# Patient Record
Sex: FEMALE | Race: WHITE | NOT HISPANIC OR LATINO | Employment: FULL TIME | URBAN - METROPOLITAN AREA
[De-identification: names, ages, dates, MRNs, and addresses within clinical notes are randomized per-mention and may not be internally consistent; named-entity substitution may affect disease eponyms.]

---

## 2019-03-10 ENCOUNTER — OFFICE VISIT (OUTPATIENT)
Dept: URGENT CARE | Facility: URGENT CARE | Age: 44
End: 2019-03-10
Payer: COMMERCIAL

## 2019-03-10 VITALS
RESPIRATION RATE: 16 BRPM | HEART RATE: 89 BPM | BODY MASS INDEX: 33.59 KG/M2 | SYSTOLIC BLOOD PRESSURE: 110 MMHG | OXYGEN SATURATION: 100 % | TEMPERATURE: 99.6 F | HEIGHT: 67 IN | DIASTOLIC BLOOD PRESSURE: 60 MMHG | WEIGHT: 214 LBS

## 2019-03-10 DIAGNOSIS — R05.9 COUGH: Primary | ICD-10-CM

## 2019-03-10 LAB
FLUAV+FLUBV AG SPEC QL: NEGATIVE
FLUAV+FLUBV AG SPEC QL: NEGATIVE
SPECIMEN SOURCE: NORMAL

## 2019-03-10 PROCEDURE — 87804 INFLUENZA ASSAY W/OPTIC: CPT | Performed by: FAMILY MEDICINE

## 2019-03-10 PROCEDURE — 99202 OFFICE O/P NEW SF 15 MIN: CPT | Performed by: FAMILY MEDICINE

## 2019-03-10 ASSESSMENT — MIFFLIN-ST. JEOR: SCORE: 1663.33

## 2019-03-11 NOTE — PROGRESS NOTES
Subjective: Yesterday afternoon patient started feeling sick, feverish, cough, chest pain.  A little yellow mucus comes up.  Last night at 4 AM her temperature was 101.5.  2 days ago she did have hemorrhoid banding procedure done.  She had a flu shot.  Nobody else at home is sick.  No unusual discharge from the rectum.    Objective: Temperature 99.6, does not appear toxic.  ENT is normal.  Neck is normal.  Lungs are clear.  Heart is regular without murmurs.  Abdomen benign.  Flu test was done, negative.    Assessment and plan: Viral URI, not influenza, expected to run the usual course.

## 2019-10-26 ENCOUNTER — OFFICE VISIT (OUTPATIENT)
Dept: URGENT CARE | Facility: URGENT CARE | Age: 44
End: 2019-10-26
Payer: COMMERCIAL

## 2019-10-26 VITALS
TEMPERATURE: 97.8 F | DIASTOLIC BLOOD PRESSURE: 60 MMHG | SYSTOLIC BLOOD PRESSURE: 98 MMHG | HEIGHT: 67 IN | RESPIRATION RATE: 12 BRPM | BODY MASS INDEX: 34.37 KG/M2 | WEIGHT: 219 LBS | HEART RATE: 80 BPM

## 2019-10-26 DIAGNOSIS — R07.0 THROAT PAIN: ICD-10-CM

## 2019-10-26 DIAGNOSIS — J03.90 TONSILLITIS: Primary | ICD-10-CM

## 2019-10-26 LAB
DEPRECATED S PYO AG THROAT QL EIA: NORMAL
SPECIMEN SOURCE: NORMAL

## 2019-10-26 PROCEDURE — 87880 STREP A ASSAY W/OPTIC: CPT | Performed by: FAMILY MEDICINE

## 2019-10-26 PROCEDURE — 99213 OFFICE O/P EST LOW 20 MIN: CPT | Performed by: FAMILY MEDICINE

## 2019-10-26 PROCEDURE — 87081 CULTURE SCREEN ONLY: CPT | Performed by: FAMILY MEDICINE

## 2019-10-26 ASSESSMENT — MIFFLIN-ST. JEOR: SCORE: 1686.01

## 2019-10-26 NOTE — PATIENT INSTRUCTIONS
Patient Education     Pharyngitis (Sore Throat), Report Pending    Pharyngitis (sore throat) is often due to a virus. It can also be caused by streptococcus (strep), bacteria. This is often called strep throat. Both viral and strep infections can cause throat pain that is worse when swallowing, aching all over, headache, and fever. Both types of infections are contagious. They may be spread by coughing, kissing, or touching others after touching your mouth or nose.  A test has been done to find out if you or your child have strep throat. Call this facility or your healthcare provider if you were not given your test results. If the test is positive for strep infection, you will need to take antibiotic medicines. A prescription can be called into your pharmacy at that time. If the test is negative, you probably have a viral pharyngitis. This does not need to be treated with antibiotics. Until you receive the results of the strep test, you should stay home from work. If your child is being tested, he or she should stay home from school.  Home care    Rest at home. Drink plenty of fluids so you won't get dehydrated.    If the test is positive for strep, you or your child should not go to work or school for the first 2 days of taking the antibiotics. After this time, you or your child will not be contagious. You or your child can then return to work or school when feeling better.     Use the antibiotic medicine for the full 10 days. Do not stop the medicine even if you or your child feel better. This is very important to make sure the infection is fully treated. It is also important to prevent medicine-resistant germs from growing. If you or your child were given an antibiotic shot, no more antibiotics are needed.    Use throat lozenges or numbing throat sprays to help reduce pain. Gargling with warm salt water will also help reduce throat pain. Dissolve 1/2 teaspoon of salt in 1 glass of warm water. Children can sip  on juice or a popsicle. Children 5 years and older can also suck on a lollipop or hard candy.    Don't eat salty or spicy foods or give them to your child. These can irritate the throat.  Other medicine for a child: You can give your child acetaminophen for fever, fussiness, or discomfort. In babies over 6 months of age, you may use ibuprofen instead of acetaminophen. If your child has chronic liver or kidney disease or ever had a stomach ulcer or GI bleeding, talk with your child s healthcare provider before giving these medicines. Aspirin should never be used by any child under 18 years of age who has a fever. It may cause severe liver damage.  Other medicine for an adult: You may use acetaminophen or ibuprofen to control pain or fever, unless another medicine was prescribed for this. If you have chronic liver or kidney disease or ever had a stomach ulcer or GI bleeding, talk with your healthcare provider before using these medicines.  Follow-up care  Follow up with your healthcare provider or our staff if you or your child don't get better over the next week.  When to seek medical advice  Call your healthcare provider right away if any of these occur:    Fever as directed by your healthcare provider. For children, seek care if:  ? Your child is of any age and has repeated fevers above 104 F (40 C).  ? Your child is younger than 2 years of age and has a fever of 100.4 F (38 C) for more than 1 day.  ? Your child is 2 years old or older and has a fever of 100.4 F (38 C) for more than 3 days.    New or worsening ear pain, sinus pain, or headache    Painful lumps in the back of neck    Stiff neck    Lymph nodes are getting larger     Can t swallow liquids, a lot of drooling, or can t open mouth wide due to throat pain    Signs of dehydration, such as very dark urine or no urine, sunken eyes, dizziness    Trouble breathing or noisy breathing    Muffled voice    New rash    Other symptoms getting worse  Prevention  Here  are steps you can take to help prevent an infection:    Keep good hand washing habits.    Don t have close contact with people who have sore throats, colds, or other upper respiratory infections.    Don t smoke, and stay away from secondhand smoke.    Stay up to date with of your vaccines.  Date Last Reviewed: 11/1/2017 2000-2018 [employer]. [address] [place], [place] [id number]. All rights reserved. This information is not intended as a substitute for professional medical care. Always follow your healthcare professional's instructions.

## 2019-10-26 NOTE — PROGRESS NOTES
"SUBJECTIVE:  Genoveva is a 43 year old female who presents to urgent care with sore throat and laryngitis.  She has been sick for 2 weeks. Initially had rhinorrhea, sore throat, body aches, chills, mild cough for almost a week. Cough was getting better when her sore throat suddenly significantly worsened. She now for 2 days can barely swallow due to pain and swelling in her throat. She again has body aches that have re-started. Kids at home, but they are not sick.   No fever.   She is a little nauseated but is also pregnant (6wks).     At baseline she has hypothyroid, asthma, meds updated in her chart. Allergies to sulfa and naproxen  Does not smoke.    OBJECTIVE:  BP 98/60   Pulse 80   Temp 97.8  F (36.6  C) (Oral)   Resp 12   Ht 1.702 m (5' 7\")   Wt 99.3 kg (219 lb)   Breastfeeding? No   BMI 34.30 kg/m     GENERAL APPEARANCE: healthy, alert and no distress  Resp: clear to auscultation bilaterally, no wheeze or crackles.   Card: RRR, no murmur, normal S1/S2, no LE swelling.  HENT: TMs normal bilaterally, posterior oropharynx is significantly erythematous with diffuse exudate on the tonsils as well as posterior pharynx. Symmetric 2+ tonsils. No other oral lesions.   Significant diffuse anterior and posterior cervical lymphadenopathy bilaterally.     Results for orders placed or performed in visit on 10/26/19   Strep, Rapid Screen   Result Value Ref Range    Specimen Description Throat     Rapid Strep A Screen       NEGATIVE: No Group A streptococcal antigen detected by immunoassay, await culture report.        ASSESSMENT/PLAN:  Genoveva was seen today for urgent care and uri.    Diagnoses and all orders for this visit:    Tonsillitis: despite negative strep, she has quite significant pharyngitis which is a second-sickening after a recent virus. This could be a secondary virus, but given the severity of her symptoms, we discussed options and elected to treat with Augmentin today (Category B in early " pregnancy)Discussed concerning symptoms for which to return.   -     amoxicillin-clavulanate (AUGMENTIN) 875-125 MG tablet; Take 1 tablet by mouth 2 times daily    Throat pain  -     Strep, Rapid Screen  -     Beta strep group A culture        The plan of care was discussed with the Patient. They understand and agree with the course of treatment prescribed. A printed summary was given including instructions and medications.      Ioana Whitaker MD  Family Medicine

## 2019-10-27 LAB
BACTERIA SPEC CULT: NORMAL
SPECIMEN SOURCE: NORMAL

## 2019-10-31 ENCOUNTER — HEALTH MAINTENANCE LETTER (OUTPATIENT)
Age: 44
End: 2019-10-31

## 2020-11-07 ENCOUNTER — HEALTH MAINTENANCE LETTER (OUTPATIENT)
Age: 45
End: 2020-11-07

## 2021-06-24 ENCOUNTER — OFFICE VISIT (OUTPATIENT)
Dept: URGENT CARE | Facility: URGENT CARE | Age: 46
End: 2021-06-24
Payer: COMMERCIAL

## 2021-06-24 VITALS
HEART RATE: 74 BPM | BODY MASS INDEX: 34.3 KG/M2 | DIASTOLIC BLOOD PRESSURE: 70 MMHG | WEIGHT: 219 LBS | SYSTOLIC BLOOD PRESSURE: 103 MMHG | OXYGEN SATURATION: 99 % | TEMPERATURE: 98.7 F

## 2021-06-24 DIAGNOSIS — S61.210A LACERATION OF RIGHT INDEX FINGER WITHOUT FOREIGN BODY WITHOUT DAMAGE TO NAIL, INITIAL ENCOUNTER: Primary | ICD-10-CM

## 2021-06-24 PROCEDURE — 99213 OFFICE O/P EST LOW 20 MIN: CPT | Performed by: FAMILY MEDICINE

## 2021-06-24 NOTE — PATIENT INSTRUCTIONS
Change dressing in 2 days using the supplies given to you        If you develop new pain/redness/fever/warmth of finger please return for re-evaluation        New tissue will gradually form over the next few weeks later in the week start using bacitracin or vaseline gel to form a thin layer on the surface and cover the finger with a bandage

## 2021-06-24 NOTE — PROGRESS NOTES
Assessment & Plan     Laceration of left index finger without foreign body without damage to nail, initial encounter       Wound rinsed in chlorhexidine /water    Attempt at pressure and elevation the finger continued to bleed -- opted to use surgifoam and then wrapped the digit with bandage+ coban. Additional supplies given    Change dressing in 2 days -    Observe for signs/symptoms of infection    Nathaniel Dutta MD   [facility]    Yesy Tomas is a 45 year old female who presents to clinic today for the following health issues:  Chief Complaint   Patient presents with     Urgent Care     Laceration     c/o laceration on finger      HPI    Cut finger while slicing brussel sprouts with mandolin this evening  No alcohol consumption    There are no active problems to display for this patient.      Current Outpatient Medications   Medication     levonorgestrel (MIRENA) 20 MCG/24HR IUD     ALLEGRA 60 MG OR TABS     amoxicillin-clavulanate (AUGMENTIN) 875-125 MG tablet     ASMANEX 120 METERED DOSES 220 MCG/INH IN AEPB     ASTELIN 137 MCG/SPRAY NA SOLN     LEVOTHYROXINE SODIUM PO     PATANOL 0.1 % OP SOLN     PROAIR  (90 BASE) MCG/ACT IN AERS     UNKNOWN MED DOSAGE     No current facility-administered medications for this visit.            Objective    /70   Pulse 74   Temp 98.7  F (37.1  C) (Tympanic)   Wt 99.3 kg (219 lb)   SpO2 99%   Breastfeeding Unknown   BMI 34.30 kg/m    Physical Exam     R finger: inner side of thumb a rectangular shaped wound about 1.5 x 0.5 cm missing section of tissue thru the dermal layer -- no damage to fingernail. Intact function in flexion/extension    No results found for any visits on 06/24/21.                  The use of Dragon/[employer] dictation services may have been used to construct the content in this note; any grammatical or spelling errors are non-intentional. Please contact the author of this note directly if you are in need of any  clarification.

## 2021-09-05 ENCOUNTER — HEALTH MAINTENANCE LETTER (OUTPATIENT)
Age: 46
End: 2021-09-05

## 2021-12-26 ENCOUNTER — HEALTH MAINTENANCE LETTER (OUTPATIENT)
Age: 46
End: 2021-12-26

## 2022-02-15 ENCOUNTER — VIRTUAL VISIT (OUTPATIENT)
Dept: CONSULT | Facility: CLINIC | Age: 47
End: 2022-02-15
Attending: GENETIC COUNSELOR, MS
Payer: COMMERCIAL

## 2022-02-15 DIAGNOSIS — Z84.81 FAMILY HISTORY OF CARRIER OF GENETIC DISEASE: Primary | ICD-10-CM

## 2022-02-15 PROCEDURE — 96040 HC GENETIC COUNSELING, EACH 30 MINUTES: CPT | Mod: GT | Performed by: GENETIC COUNSELOR, MS

## 2022-02-15 RX ORDER — MULTIVITAMIN,THER AND MINERALS
1 TABLET ORAL DAILY
COMMUNITY

## 2022-02-15 RX ORDER — SERTRALINE HYDROCHLORIDE 25 MG/1
25 TABLET, FILM COATED ORAL EVERY MORNING
COMMUNITY
Start: 2021-03-10

## 2022-02-15 NOTE — LETTER
2/15/2022      RE: Genoveva Gregory  [address] [place] [address]       Name:  Genoveva Gregory  :   1975  MRN:   [id number]  Date of service: Feb 15, 2022  Primary Provider: No Ref-Primary, Physician  Referring Provider: Referred Self    Presenting Information:  Genoveva, a 46 year old female, was seen via a video visit at the [facility] for discussion of carrier testing for glycogen storage disease V.  I met with Genoveva to discuss the genetics and inheritance of GSD V and to obtain informed consent for genetic testing.       Family History:   A limited family history was obtained today. The following information is notable:    Genoveva has a history of a wheat allergy and subclinical hypothyroidism.     Genoveva has 4 sons (ages 6,3,1, and 1) all of whom are well.    Genoveva's , Levon, was seen at the [facility] for evaluation of multiple episodes of rhabdomyolysis. 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 Follow-up parental testing found Levon's mother to carry the PYGM: c.661-601G>A and Levon's father was found to carry the PYGM: c.1805G>A. This confirms that the variants are on opposite chromosomes and thus were inherited in a way which can cause glycogen storage disease V in Levon. The PYGM: c.1805G>A variant is still considered a variant of uncertain significance by the lab; however, we believe with Levon's clinical presentation and these results that it is diease causing. It is possible that the variant will be officially reclassified as disease-causing in the future. At this point, we consider these results diagnostic for glycogen storage disease V.       The family history is otherwise negative for elevated CK, muscle weakness/pain, exercise intolerance, or known genetic disorders.       Discussion and  Assessment:  Genetics  Genes are long stretches of DNA that are responsible for how our bodies look and how our bodies work. Our genes are inherited on structures called chromosomes of which we have 23 pairs. One copy of each chromosome in a pair is inherited from the mother and one is inherited from the father. Changes in the DNA sequence of a gene, called mutations, as well as changes within the chromosomes can cause the signs and symptoms of a genetic condition.     GSD V  Glycogen storage disease V (GSD V) is characterized by exercise intolerance. Symptoms include exertion-induced premature fatigue, myalgia, muscle cramping, and recurrent myoglobinuria. Symptoms typically begin in a person's teens or twenties, but the can appear anytime from infancy to adulthood.  In most people with GSD V, the muscle weakness worsens over time; however, in about one-third of affected individuals, the muscle weakness is stable. Some people with GSD V experience mild symptoms such as poor stamina; others do not experience any symptoms.     Inheritance of GSD V  GSD V is inherited in an autosomal recessive way. This means that both copies of a person's PYGM gene need to have pathogenic variants in them in order for a person to show symptoms of the condition. Individuals who only have one pathogenic variant are called carriers. While carriers generally do not show signs or symptoms of the condition, this can have implications for future generations so genetic counseling and testing can be important.     Risk to Family Members  Children between Genoveva and Levon are each automatically a carrier for GSD V. They could only be affected if Genoveva is a carrier of GSD V. We reviewed that carrier testing is available to Genoveva to better determine this risk. This will involve analyzing Genoveva's copies of the PYGM gene to determine if she carries any pathogenic changes. If Genoveva is not found to be a carrier, each of their children would be a carrier for  GSD V, but would not be at an increased risk of being affected with GSD V. Their future partners could consider their own carrier testing as needed when they are considering starting their own families. If Genoveva is found to be a carrier, each of their children would have a 50% chance of being affected with GSD V and a 50% chance of being a carrier. Their own metabolic consultation and genetic testing would be recommended at that time to determine their risk.      Resources:     Medline Plus: https://medlineplus.gov/genetics/condition/glycogen-storage-disease-type-v/    Rare Disease: https://rarediseases.org/rare-diseases/glycogen-storage-disease-type-v/    GeneReviews: https://www.ncbi.nlm.nih.gov/books/CMO0388/     Genetic Testing   Genoveva was interested in proceeding with carrier testing for GSD V today. The benefits, limitations, and possible results from the testing were discussed. It is medically necessary to determine if Genoveva's children are at an increased risk of having GSD V so they can be appropriately monitored and managed.     The lab we are using for this test is called [facility]. They will send a buccal kit directly to Genoveva who will then be instructed to collect the specimen and mail it back to the lab. This kit must be completed and appropriately labelled with name and date of birth.    Insurance and billing procedures were covered with Genoveva. Once [facility] receives the sample, they will do a benefits investigation and contact Genoveva with an estimated out of pocket cost if expected to be more than $100. This estimation is not guaranteed. At that time, Genoveva has the right to decline to proceed with testing based on the benefits investigation. If [facility] does not hear back from Genoveva after three attempts to connect, testing will be canceled. If the benefits investigation is too high for Genoveva Thomas offers financial assistance based on house-hold income and household size. she may also switch to the patient-pay  price. If the estimation of benefits is less than $100, Genoveva will not be contacted and testing will be automatically initiated.     Genoveva provided verbal informed consent for the testing due to COVID-19. I will plan to follow-up with Genoveva by phone when results are returned, approximately 3 weeks after the testing is initiated. A follow-up appointment will be scheduled as needed. Additional questions or concerns were denied at this time.       Plan:  1. A buccal swab kit will be sent directly to Genoveva from the laboratory. Once the kit is received back by the laboratory, the benefits investigation will begin and Genoveva will be contacted with the outcome.    2. If they want to proceed at that time, Genoveva's PYGM gene analysis will be initiated. If the estimation is less than $100, she will not be contacted and testing will begin automatically.    3. Results are expected approximately 3 weeks after this and will be returned by phone; a follow-up appointment will be scheduled as needed at that time.    4. Contact information was provided should any questions arise in the future.       Krystal Stoddard MS, [facility]  Genetic Counselor  [facility]   Phone: [phone]        Approximate Time Spent in Consultation: 18 min     CC: no letter      Krystal Stoddard GC

## 2022-02-15 NOTE — NURSING NOTE
How would you like to obtain your AVS? [place]ar[facility]  If the video visit is dropped, the invitation should be resent by: Send to e-mail at: [email or web address]  Will anyone else be joining your video visit? No

## 2022-02-15 NOTE — LETTER
Date:February 16, 2022      Provider requested that no letter be sent. Do not send.       [facility]

## 2022-03-23 ENCOUNTER — TELEPHONE (OUTPATIENT)
Dept: CONSULT | Facility: CLINIC | Age: 47
End: 2022-03-23
Payer: COMMERCIAL

## 2022-03-23 NOTE — TELEPHONE ENCOUNTER
I called Genoveva to discuss the results of her genetic testing.    Genoveva's PYGM gene analysis was completed at [facility], [place]. These results were negative/normal. Genoveva was not found to carry any disease-causing or uncertain changes in the PYGM gene. Based on this result, Genoveva is not a carrier for glycogen storage disease V (GSD V).     Because Genoveva's partner has GSD V, each child between the couple is automatically a carrier for GSD V. However, they are not expected to be at an increased risk of being affected with GSD V. They could have a child with GSD V if their future partner was also a carrier or affected. They can consider their own genetic counseling and carrier testing for their partners when they are starting their own families.    Genoveva and her family are encouraged to reach out if questions come up about these results in the future. I will send a copy of the report to Genoveva for her own records.      Krystal Stoddard MS, [facility]  Genetic Counselor  [facility]   Phone: [phone]

## 2022-03-23 NOTE — LETTER
TO: Genoveva Gregory  [address] [place] [address]       March 23, 2022      Dear Genoveva,    This letter is being provided as a summary of your recent genetic testing results. I have also included a copy of the testing report for your own records.     Results  Your PYGM gene analysis was completed at [facility]. These results were negative/normal. You were not found to carry any disease-causing or uncertain changes in the PYGM gene. Based on this result, you are not a carrier for glycogen storage disease V (GSD V).     Genetics  Genes are long stretches of DNA that are responsible for how our bodies look and how our bodies work. Our genes are inherited on structures called chromosomes of which we have 23 pairs. One copy of each chromosome in a pair is inherited from the mother and one is inherited from the father. Changes in the DNA sequence of a gene, called mutations, as well as changes within the chromosomes can cause the signs and symptoms of a genetic condition.      GSD V  Glycogen storage disease V (GSD V) is characterized by exercise intolerance. Symptoms include exertion-induced premature fatigue, myalgia, muscle cramping, and recurrent myoglobinuria. Symptoms typically begin in a person's teens or twenties, but the can appear anytime from infancy to adulthood.  In most people with GSD V, the muscle weakness worsens over time; however, in about one-third of affected individuals, the muscle weakness is stable. Some people with GSD V experience mild symptoms such as poor stamina; others do not experience any symptoms.     Inheritance of GSD V  GSD V is inherited in an autosomal recessive way. This means that both copies of a person's PYGM gene need to have pathogenic variants in them in order for a person to show symptoms of the condition. Individuals who only have one pathogenic variant are called carriers. While carriers generally do not show signs or symptoms of the condition, this can have  implications for future generations so genetic counseling and testing can be important.     Risk to Children  Because your partner has GSD V, each child between the two of you is automatically a carrier for GSD V. However, because you are not a carrier, your children are not expected to be at an increased risk of being affected with GSD V. They could have a child with GSD V if their future partner was also a carrier or affected. They can consider their own genetic counseling and carrier testing for their partners when they are starting their own families.     Resources:     Medline Plus: https://medlineplus.gov/genetics/condition/glycogen-storage-disease-type-v/    Rare Disease: https://rarediseases.org/rare-diseases/glycogen-storage-disease-type-v/    GeneReviews: https://www.ncbi.nlm.nih.gov/books/LEC3348/    You are encouraged to reach out to me if questions come up about these results in the future.       Sincerely,    Krystal Stoddard MS, [facility]  Genetic Counselor  [facility]   Phone: [phone]

## 2022-10-23 ENCOUNTER — HEALTH MAINTENANCE LETTER (OUTPATIENT)
Age: 47
End: 2022-10-23

## 2022-11-25 ENCOUNTER — OFFICE VISIT (OUTPATIENT)
Dept: FAMILY MEDICINE | Facility: CLINIC | Age: 47
End: 2022-11-25
Payer: COMMERCIAL

## 2022-11-25 VITALS
OXYGEN SATURATION: 98 % | BODY MASS INDEX: 38.29 KG/M2 | RESPIRATION RATE: 18 BRPM | TEMPERATURE: 99.2 F | SYSTOLIC BLOOD PRESSURE: 144 MMHG | DIASTOLIC BLOOD PRESSURE: 78 MMHG | HEART RATE: 108 BPM | WEIGHT: 244.5 LBS

## 2022-11-25 DIAGNOSIS — R09.81 CONGESTION OF PARANASAL SINUS: Primary | ICD-10-CM

## 2022-11-25 PROCEDURE — 99213 OFFICE O/P EST LOW 20 MIN: CPT | Performed by: PHYSICIAN ASSISTANT

## 2022-11-25 RX ORDER — METFORMIN HCL 500 MG
500 TABLET, EXTENDED RELEASE 24 HR ORAL
COMMUNITY
Start: 2022-10-31

## 2022-11-26 NOTE — PATIENT INSTRUCTIONS
Continue Sudafed. Start Claritin, Flonase, you can also use Afrin (3 days only)   Follow up if no improvement in 4 days.

## 2023-06-01 ENCOUNTER — HEALTH MAINTENANCE LETTER (OUTPATIENT)
Age: 48
End: 2023-06-01

## 2024-01-20 ENCOUNTER — HEALTH MAINTENANCE LETTER (OUTPATIENT)
Age: 49
End: 2024-01-20

## 2024-06-08 ENCOUNTER — HEALTH MAINTENANCE LETTER (OUTPATIENT)
Age: 49
End: 2024-06-08

## 2025-01-26 ENCOUNTER — HEALTH MAINTENANCE LETTER (OUTPATIENT)
Age: 50
End: 2025-01-26

## 2025-06-15 ENCOUNTER — HEALTH MAINTENANCE LETTER (OUTPATIENT)
Age: 50
End: 2025-06-15